# Patient Record
Sex: MALE | Race: BLACK OR AFRICAN AMERICAN | URBAN - METROPOLITAN AREA
[De-identification: names, ages, dates, MRNs, and addresses within clinical notes are randomized per-mention and may not be internally consistent; named-entity substitution may affect disease eponyms.]

---

## 2021-07-13 ENCOUNTER — APPOINTMENT (RX ONLY)
Dept: URBAN - METROPOLITAN AREA CLINIC 41 | Facility: CLINIC | Age: 76
Setting detail: DERMATOLOGY
End: 2021-07-13

## 2021-07-13 DIAGNOSIS — L20.89 OTHER ATOPIC DERMATITIS: ICD-10-CM | Status: INADEQUATELY CONTROLLED

## 2021-07-13 DIAGNOSIS — D22 MELANOCYTIC NEVI: ICD-10-CM | Status: INADEQUATELY CONTROLLED

## 2021-07-13 PROBLEM — L20.84 INTRINSIC (ALLERGIC) ECZEMA: Status: ACTIVE | Noted: 2021-07-13

## 2021-07-13 PROBLEM — D48.5 NEOPLASM OF UNCERTAIN BEHAVIOR OF SKIN: Status: ACTIVE | Noted: 2021-07-13

## 2021-07-13 PROCEDURE — 11102 TANGNTL BX SKIN SINGLE LES: CPT

## 2021-07-13 PROCEDURE — ? PRESCRIPTION MEDICATION MANAGEMENT

## 2021-07-13 PROCEDURE — ? PRESCRIPTION

## 2021-07-13 PROCEDURE — ? COUNSELING

## 2021-07-13 PROCEDURE — 99203 OFFICE O/P NEW LOW 30 MIN: CPT | Mod: 25

## 2021-07-13 PROCEDURE — ? BIOPSY BY SHAVE METHOD

## 2021-07-13 PROCEDURE — ? ADDITIONAL NOTES

## 2021-07-13 RX ORDER — DESONIDE 0.5 MG/G
CREAM TOPICAL
Qty: 1 | Refills: 0 | Status: ERX | COMMUNITY
Start: 2021-07-13

## 2021-07-13 RX ADMIN — DESONIDE: 0.5 CREAM TOPICAL at 00:00

## 2021-07-13 ASSESSMENT — LOCATION ZONE DERM: LOCATION ZONE: FACE

## 2021-07-13 ASSESSMENT — LOCATION DETAILED DESCRIPTION DERM
LOCATION DETAILED: RIGHT INFERIOR CENTRAL MALAR CHEEK
LOCATION DETAILED: RIGHT SUPERIOR MEDIAL MALAR CHEEK

## 2021-07-13 ASSESSMENT — LOCATION SIMPLE DESCRIPTION DERM: LOCATION SIMPLE: RIGHT CHEEK

## 2021-07-13 NOTE — HPI: EVALUATION OF SKIN LESION(S)
Hpi Title: Evaluation of a Skin Lesion
How Severe Are Your Spot(S)?: moderate
Have Your Spot(S) Been Treated In The Past?: has not been treated
Additional History: Pt reports spot on face that is gradually enlarging. Recently started itching as well.

## 2021-07-13 NOTE — PROCEDURE: ADDITIONAL NOTES
Additional Notes: Patient consent was obtained to proceed with the visit and recommended plan of care after discussion of all risks and benefits, including the risks of COVID-19 exposure.
Detail Level: Simple
Additional Notes: SP also recommended cerave and creams with no fragrance.
Render Risk Assessment In Note?: no
Additional Notes: Sp educates patient of risks of bx including scarring and need for additional tx, expectations, post care. Patient gives consent and verbalized understanding